# Patient Record
Sex: MALE | Race: WHITE | NOT HISPANIC OR LATINO | Employment: FULL TIME | URBAN - METROPOLITAN AREA
[De-identification: names, ages, dates, MRNs, and addresses within clinical notes are randomized per-mention and may not be internally consistent; named-entity substitution may affect disease eponyms.]

---

## 2024-08-08 ENCOUNTER — OFFICE VISIT (OUTPATIENT)
Dept: INTERNAL MEDICINE | Facility: CLINIC | Age: 19
End: 2024-08-08
Payer: COMMERCIAL

## 2024-08-08 ENCOUNTER — PATIENT ROUNDING (BHMG ONLY) (OUTPATIENT)
Dept: INTERNAL MEDICINE | Facility: CLINIC | Age: 19
End: 2024-08-08
Payer: COMMERCIAL

## 2024-08-08 VITALS
SYSTOLIC BLOOD PRESSURE: 102 MMHG | HEIGHT: 75 IN | DIASTOLIC BLOOD PRESSURE: 70 MMHG | OXYGEN SATURATION: 100 % | BODY MASS INDEX: 17.41 KG/M2 | HEART RATE: 68 BPM | TEMPERATURE: 97.7 F | WEIGHT: 140 LBS

## 2024-08-08 DIAGNOSIS — K58.2 IRRITABLE BOWEL SYNDROME WITH BOTH CONSTIPATION AND DIARRHEA: ICD-10-CM

## 2024-08-08 DIAGNOSIS — Z00.00 HEALTHCARE MAINTENANCE: Primary | ICD-10-CM

## 2024-08-08 DIAGNOSIS — E55.9 VITAMIN D DEFICIENCY: ICD-10-CM

## 2024-08-08 PROCEDURE — 99213 OFFICE O/P EST LOW 20 MIN: CPT | Performed by: NURSE PRACTITIONER

## 2024-08-08 PROCEDURE — 99385 PREV VISIT NEW AGE 18-39: CPT | Performed by: NURSE PRACTITIONER

## 2024-08-08 RX ORDER — SACCHAROMYCES BOULARDII 250 MG
250 CAPSULE ORAL 2 TIMES DAILY
Qty: 60 CAPSULE | Refills: 1 | Status: SHIPPED | OUTPATIENT
Start: 2024-08-08

## 2024-08-08 RX ORDER — NAPROXEN 500 MG/1
500 TABLET ORAL
COMMUNITY
Start: 2024-07-30 | End: 2024-08-09

## 2024-08-08 NOTE — PROGRESS NOTES
"Juan M Burrell is a 18 y.o. male and is here for a comprehensive physical exam. New patient here to establish care.  Health history and questionnaire have been reviewed in its entirety. The patient's previous primary care provider was his pediatrician.     The patient reports  : c/o frequent bowel movements. He has nausea, diarrhea, abdominal cramping.  He also has episodes of constipation. This has been going on since middle school. He has not had any treatment for it. He hasn't seen GI. Spicy foods makes it worse. He wakes up every morning with symptoms. His mother has IBS, but no history of Crohn's or ulcerative colitis in family. Weight is stable.  Patient is requesting FMLA.  He does not have a specific number of days that he is missed for this condition, but he is needing extra time to go to the restroom.  He works on an assembly line. History of appendectomy    Do you take any herbs or supplements that were not prescribed by a doctor? no    The following portions of the patient's history were reviewed and updated as appropriate: allergies, current medications, past family history, past medical history, past social history, past surgical history and problem list.    Review of Systems  Do you have pain that bothers you in your daily life? no  Pertinent items are noted in HPI.    Objective   /70 (BP Location: Left arm, Patient Position: Sitting, Cuff Size: Adult)   Pulse 68   Temp 97.7 °F (36.5 °C)   Ht 190.5 cm (75\")   Wt 63.5 kg (140 lb)   SpO2 100%   BMI 17.50 kg/m²     General Appearance:    Alert, cooperative, no distress, appears stated age   Head:    Normocephalic, without obvious abnormality, atraumatic   Eyes:    PERRL, conjunctiva/corneas clear, EOM's intact, both eyes   Ears:    Normal TM's and external ear canals, both ears   Nose:   Nares normal, septum midline, mucosa normal, no drainage    or sinus tenderness   Throat:   Lips, mucosa, and tongue normal; teeth and gums normal "   Neck:   Supple, symmetrical, trachea midline, no adenopathy;     thyroid:  no enlargement/tenderness/nodules   Back:     Symmetric, no curvature, ROM normal, no CVA tenderness   Lungs:     Clear to auscultation bilaterally, respirations unlabored   Chest Wall:    No tenderness or deformity    Heart:    Regular rate and rhythm, S1 and S2 normal, no murmur       Abdomen:     Soft, non-tender, bowel sounds active all four quadrants,     no masses, no organomegaly           Extremities:   Extremities normal, atraumatic, no cyanosis or edema   Pulses:   2+ and symmetric all extremities   Skin:   Skin color, texture, turgor normal, no rashes or lesions   Lymph nodes:   Cervical, supraclavicular, and axillary nodes normal   Neurologic:   Grossly intact, normal strength, sensation and reflexes     throughout        Assessment & Plan   Healthy male exam.      1. Diagnoses and all orders for this visit:    1. Healthcare maintenance (Primary)  -     CBC & Differential; Future  -     Comprehensive Metabolic Panel; Future  -     Lipid Panel With / Chol / HDL Ratio; Future  -     TSH; Future  -     Hepatitis C Antibody; Future    2. Irritable bowel syndrome with both constipation and diarrhea  -     saccharomyces boulardii (Florastor) 250 MG capsule; Take 1 capsule by mouth 2 (Two) Times a Day.  Dispense: 60 capsule; Refill: 1  -     Ambulatory Referral to Gastroenterology  -     Amylase; Future  -     Lipase; Future    3. Vitamin D deficiency  -     Vitamin D,25-Hydroxy; Future    IBS -will check labs.  Patient will be started on a probiotic.  Will refer to GI    2. Patient Counseling:  --Nutrition: Stressed importance of moderation in sodium/caffeine intake, saturated fat and cholesterol, caloric balance, sufficient intake of fresh fruits, vegetables, fiber, calcium, iron.   --Exercise: Stressed the importance of regular exercise. Active job   --Dental health: Discussed importance of regular tooth brushing, flossing, and  dental visits.  --Immunizations reviewed.    3. Discussed the patient's BMI with him.  The BMI is in the acceptable range  BMI is below normal parameters (malnutrition). Recommendations: treating the underlying disease process       4. Follow up  for fasting labs. Patient did not want to have labs drawn today

## 2024-08-08 NOTE — PROGRESS NOTES
August 8, 2024    PATIENT ROUNDING OBTAINED AT CHECKOUT.      PATIENT WAS REFERRED TO OUR PRACTICE BY HIS GIRLFRIEND WHO IS A PATIENT HERE.  HE HAD NO DIFFICULTIES MAKING HIS NEW PATIENT APPOINTMENT.    HE STATES HIS VISIT WENT WELL TODAY WITH NO PROBLEMS FROM THE TIME HE CHECKED IN UNTIL THE TIME HE CHECKED OUT.  HE COULD THINK OF NO RECOMMENDATIONS THAT WOULD HAVE MADE HIS EXPERIENCE ANY BETTER TODAY.    HE WOULD RECOMMEND OUR PRACTICE TO FAMILY AND FRIENDS.

## 2024-08-15 ENCOUNTER — TELEMEDICINE (OUTPATIENT)
Dept: INTERNAL MEDICINE | Facility: CLINIC | Age: 19
End: 2024-08-15
Payer: COMMERCIAL

## 2024-08-15 DIAGNOSIS — F41.9 ANXIETY: Primary | ICD-10-CM

## 2024-08-15 PROCEDURE — 99214 OFFICE O/P EST MOD 30 MIN: CPT | Performed by: NURSE PRACTITIONER
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
